# Patient Record
Sex: MALE | Race: BLACK OR AFRICAN AMERICAN | Employment: UNEMPLOYED | URBAN - METROPOLITAN AREA
[De-identification: names, ages, dates, MRNs, and addresses within clinical notes are randomized per-mention and may not be internally consistent; named-entity substitution may affect disease eponyms.]

---

## 2020-10-23 ENCOUNTER — OFFICE VISIT (OUTPATIENT)
Dept: FAMILY MEDICINE CLINIC | Age: 50
End: 2020-10-23
Payer: MEDICARE

## 2020-10-23 VITALS
OXYGEN SATURATION: 99 % | WEIGHT: 204.4 LBS | SYSTOLIC BLOOD PRESSURE: 112 MMHG | BODY MASS INDEX: 24.89 KG/M2 | RESPIRATION RATE: 16 BRPM | HEIGHT: 76 IN | DIASTOLIC BLOOD PRESSURE: 78 MMHG | HEART RATE: 79 BPM | TEMPERATURE: 98.7 F

## 2020-10-23 PROBLEM — F43.20 GRIEF REACTION: Status: ACTIVE | Noted: 2020-10-23

## 2020-10-23 PROBLEM — Z72.0 TOBACCO ABUSE: Status: ACTIVE | Noted: 2020-10-23

## 2020-10-23 PROBLEM — N52.1 ERECTILE DYSFUNCTION DUE TO DISEASES CLASSIFIED ELSEWHERE: Status: ACTIVE | Noted: 2020-10-23

## 2020-10-23 PROBLEM — F43.21 GRIEF REACTION: Status: ACTIVE | Noted: 2020-10-23

## 2020-10-23 PROBLEM — Z80.0 FAMILY HISTORY OF COLON CANCER: Status: ACTIVE | Noted: 2020-10-23

## 2020-10-23 PROCEDURE — 99203 OFFICE O/P NEW LOW 30 MIN: CPT | Performed by: PHYSICIAN ASSISTANT

## 2020-10-23 SDOH — HEALTH STABILITY: MENTAL HEALTH: HOW OFTEN DO YOU HAVE A DRINK CONTAINING ALCOHOL?: NEVER

## 2020-10-23 ASSESSMENT — ENCOUNTER SYMPTOMS
COUGH: 0
DIARRHEA: 0
NAUSEA: 0
COLOR CHANGE: 0
CONSTIPATION: 0
SHORTNESS OF BREATH: 0
ANAL BLEEDING: 0
TROUBLE SWALLOWING: 0
CHEST TIGHTNESS: 0
WHEEZING: 0
BACK PAIN: 0
RECTAL PAIN: 0
VOMITING: 0
ABDOMINAL DISTENTION: 0
BLOOD IN STOOL: 0
ABDOMINAL PAIN: 0

## 2020-10-23 ASSESSMENT — PATIENT HEALTH QUESTIONNAIRE - PHQ9
1. LITTLE INTEREST OR PLEASURE IN DOING THINGS: 0
2. FEELING DOWN, DEPRESSED OR HOPELESS: 0
SUM OF ALL RESPONSES TO PHQ9 QUESTIONS 1 & 2: 0
SUM OF ALL RESPONSES TO PHQ QUESTIONS 1-9: 0

## 2020-10-23 NOTE — PROGRESS NOTES
Subjective  Ragena Ing, 54 y.o. male presents today with:  Chief Complaint   Patient presents with    Establish Care     HPI  Kristina Sam is in the office today to establish care. Previous PCP: no one. Declined lab work. Does not want flu vaccine. Does not want referral to GI to discuss early     Grief reaction. Recently lost his mom and older sister in the past 4-6 weeks. His mother had terminal colon CA, passed at age 60 y/o. Initially was diagnosed when she was younger than 48. Sister was killed tragically in a motorcycle accident in [facility]. Reports that he is \"dealing with his grief\". Has been having issues with ED since the death of both his mom and sister. Feels like his mind \"goes elsewhere\" during intimacy. No prior problems with achieving, maintaining or having an orgasm. Denies SI/HI. Tobacco abuse. Smoking about 1 PPD. Would like to quit. Not ready at this time. Review of Systems   Constitutional: Negative for activity change, appetite change, chills, diaphoresis, fatigue, fever and unexpected weight change. HENT: Negative for congestion, postnasal drip and trouble swallowing. Eyes: Negative for visual disturbance. Respiratory: Negative for cough, chest tightness, shortness of breath and wheezing. Cardiovascular: Negative for chest pain, palpitations and leg swelling. Gastrointestinal: Negative for abdominal distention, abdominal pain, anal bleeding, blood in stool, constipation, diarrhea, nausea, rectal pain and vomiting. Endocrine: Negative for polydipsia, polyphagia and polyuria. Genitourinary: Negative for decreased urine volume, difficulty urinating, discharge, dysuria, enuresis, flank pain, frequency, genital sores, hematuria, penile pain, penile swelling, scrotal swelling, testicular pain and urgency. ED   Musculoskeletal: Negative for arthralgias, back pain, gait problem, joint swelling, myalgias, neck pain and neck stiffness. Skin: Negative for color change, pallor, rash and wound. Neurological: Negative for dizziness, speech difficulty, weakness and light-headedness. Psychiatric/Behavioral: Positive for dysphoric mood (grief). Negative for agitation, confusion, decreased concentration, hallucinations, self-injury, sleep disturbance and suicidal ideas. The patient is not nervous/anxious and is not hyperactive. No past medical history on file. No past surgical history on file.   Social History     Socioeconomic History    Marital status: Single     Spouse name: Not on file    Number of children: Not on file    Years of education: Not on file    Highest education level: Not on file   Occupational History    Not on file   Social Needs    Financial resource strain: Not on file    Food insecurity     Worry: Not on file     Inability: Not on file    Transportation needs     Medical: Not on file     Non-medical: Not on file   Tobacco Use    Smoking status: Current Every Day Smoker     Packs/day: 1.00     Years: 15.00     Pack years: 15.00     Types: Cigarettes    Smokeless tobacco: Never Used   Substance and Sexual Activity    Alcohol use: Never     Frequency: Never    Drug use: Yes     Types: Marijuana    Sexual activity: Not on file   Lifestyle    Physical activity     Days per week: Not on file     Minutes per session: Not on file    Stress: Not on file   Relationships    Social connections     Talks on phone: Not on file     Gets together: Not on file     Attends [employer] service: Not on file     Active member of club or organization: Not on file     Attends meetings of clubs or organizations: Not on file     Relationship status: Not on file    Intimate partner violence     Fear of current or ex partner: Not on file     Emotionally abused: Not on file     Physically abused: Not on file     Forced sexual activity: Not on file   Other Topics Concern    Not on file   Social History Narrative    Not on file     No family history on file. No Known Allergies  No current outpatient medications on file. No current facility-administered medications for this visit. PMH, Surgical Hx, Family Hx, and Social Hx reviewed and updated. Health Maintenance reviewed. Objective  Vitals:    10/23/20 1259   BP: 112/78   Site: Left Upper Arm   Position: Sitting   Cuff Size: Large Adult   Pulse: 79   Resp: 16   Temp: 98.7 °F (37.1 °C)   TempSrc: Temporal   SpO2: 99%   Weight: 204 lb 6.4 oz (92.7 kg)   Height: 6' 4\" (1.93 m)     BP Readings from Last 3 Encounters:   10/23/20 112/78     Wt Readings from Last 3 Encounters:   10/23/20 204 lb 6.4 oz (92.7 kg)     Physical Exam  Vitals signs reviewed. Constitutional:       Appearance: Normal appearance. HENT:      Head: Normocephalic and atraumatic. Right Ear: External ear normal.      Left Ear: External ear normal.      Nose: Nose normal.      Mouth/Throat:      Mouth: Mucous membranes are moist.   Eyes:      Conjunctiva/sclera: Conjunctivae normal.      Pupils: Pupils are equal, round, and reactive to light. Cardiovascular:      Rate and Rhythm: Normal rate and regular rhythm. Pulmonary:      Effort: Pulmonary effort is normal.      Breath sounds: Normal breath sounds. Musculoskeletal: Normal range of motion. Skin:     General: Skin is warm. Neurological:      General: No focal deficit present. Mental Status: He is alert and oriented to person, place, and time. Psychiatric:         Attention and Perception: Attention normal.         Mood and Affect: Affect is flat. Speech: Speech normal.         Behavior: Behavior normal.         Thought Content: Thought content normal.         Cognition and Memory: Cognition normal.         Judgment: Judgment normal.      Comments: Noticeably upset with loss of his mother and sister. Very close to both. \"dealing with his emotions\". Thinks much of his ED is from his emotions/feeling.   Trying to process

## 2021-03-14 ENCOUNTER — HOSPITAL ENCOUNTER (EMERGENCY)
Age: 51
Discharge: HOME | End: 2021-03-14
Attending: EMERGENCY MEDICINE
Payer: MEDICARE

## 2021-03-14 ENCOUNTER — APPOINTMENT (OUTPATIENT)
Dept: CT IMAGING | Age: 51
End: 2021-03-14
Payer: MEDICARE

## 2021-03-14 VITALS
HEIGHT: 76 IN | RESPIRATION RATE: 16 BRPM | OXYGEN SATURATION: 99 % | HEART RATE: 62 BPM | BODY MASS INDEX: 23.75 KG/M2 | TEMPERATURE: 97.8 F | DIASTOLIC BLOOD PRESSURE: 84 MMHG | SYSTOLIC BLOOD PRESSURE: 121 MMHG | WEIGHT: 195 LBS

## 2021-03-14 DIAGNOSIS — M54.40 ACUTE LEFT-SIDED LOW BACK PAIN WITH SCIATICA, SCIATICA LATERALITY UNSPECIFIED: Primary | ICD-10-CM

## 2021-03-14 LAB
ALBUMIN SERPL-MCNC: 4 G/DL (ref 3.5–4.6)
ALP BLD-CCNC: 96 U/L (ref 35–104)
ALT SERPL-CCNC: 13 U/L (ref 0–41)
ANION GAP SERPL CALCULATED.3IONS-SCNC: 7 MEQ/L (ref 9–15)
AST SERPL-CCNC: 18 U/L (ref 0–40)
BASOPHILS ABSOLUTE: 0.1 K/UL (ref 0–0.2)
BASOPHILS RELATIVE PERCENT: 1 %
BILIRUB SERPL-MCNC: 0.3 MG/DL (ref 0.2–0.7)
BILIRUBIN URINE: NEGATIVE
BLOOD, URINE: NEGATIVE
BUN BLDV-MCNC: 9 MG/DL (ref 6–20)
CALCIUM SERPL-MCNC: 9.1 MG/DL (ref 8.5–9.9)
CHLORIDE BLD-SCNC: 102 MEQ/L (ref 95–107)
CLARITY: CLEAR
CO2: 28 MEQ/L (ref 20–31)
COLOR: YELLOW
CREAT SERPL-MCNC: 1 MG/DL (ref 0.7–1.2)
EOSINOPHILS ABSOLUTE: 0.1 K/UL (ref 0–0.7)
EOSINOPHILS RELATIVE PERCENT: 1.7 %
GFR AFRICAN AMERICAN: >60
GFR NON-AFRICAN AMERICAN: >60
GLOBULIN: 2.5 G/DL (ref 2.3–3.5)
GLUCOSE BLD-MCNC: 93 MG/DL (ref 70–99)
GLUCOSE URINE: NEGATIVE MG/DL
HCT VFR BLD CALC: 40.9 % (ref 42–52)
HEMOGLOBIN: 13.7 G/DL (ref 14–18)
KETONES, URINE: NEGATIVE MG/DL
LEUKOCYTE ESTERASE, URINE: NEGATIVE
LYMPHOCYTES ABSOLUTE: 1.6 K/UL (ref 1–4.8)
LYMPHOCYTES RELATIVE PERCENT: 26.2 %
MCH RBC QN AUTO: 33.1 PG (ref 27–31.3)
MCHC RBC AUTO-ENTMCNC: 33.6 % (ref 33–37)
MCV RBC AUTO: 98.6 FL (ref 80–100)
MONOCYTES ABSOLUTE: 0.7 K/UL (ref 0.2–0.8)
MONOCYTES RELATIVE PERCENT: 12.1 %
NEUTROPHILS ABSOLUTE: 3.6 K/UL (ref 1.4–6.5)
NEUTROPHILS RELATIVE PERCENT: 59 %
NITRITE, URINE: NEGATIVE
PDW BLD-RTO: 13.7 % (ref 11.5–14.5)
PH UA: 6.5 (ref 5–9)
PLATELET # BLD: 273 K/UL (ref 130–400)
POTASSIUM SERPL-SCNC: 4.7 MEQ/L (ref 3.4–4.9)
PROTEIN UA: NEGATIVE MG/DL
RBC # BLD: 4.14 M/UL (ref 4.7–6.1)
SODIUM BLD-SCNC: 137 MEQ/L (ref 135–144)
SPECIFIC GRAVITY UA: 1.02 (ref 1–1.03)
TOTAL PROTEIN: 6.5 G/DL (ref 6.3–8)
UROBILINOGEN, URINE: 1 E.U./DL
WBC # BLD: 6.1 K/UL (ref 4.8–10.8)

## 2021-03-14 PROCEDURE — 36415 COLL VENOUS BLD VENIPUNCTURE: CPT

## 2021-03-14 PROCEDURE — 74176 CT ABD & PELVIS W/O CONTRAST: CPT

## 2021-03-14 PROCEDURE — 80053 COMPREHEN METABOLIC PANEL: CPT

## 2021-03-14 PROCEDURE — 85025 COMPLETE CBC W/AUTO DIFF WBC: CPT

## 2021-03-14 PROCEDURE — 96374 THER/PROPH/DIAG INJ IV PUSH: CPT

## 2021-03-14 PROCEDURE — 2580000003 HC RX 258: Performed by: EMERGENCY MEDICINE

## 2021-03-14 PROCEDURE — 96375 TX/PRO/DX INJ NEW DRUG ADDON: CPT

## 2021-03-14 PROCEDURE — 6360000002 HC RX W HCPCS: Performed by: EMERGENCY MEDICINE

## 2021-03-14 PROCEDURE — 99284 EMERGENCY DEPT VISIT MOD MDM: CPT

## 2021-03-14 PROCEDURE — 81003 URINALYSIS AUTO W/O SCOPE: CPT

## 2021-03-14 RX ORDER — 0.9 % SODIUM CHLORIDE 0.9 %
1000 INTRAVENOUS SOLUTION INTRAVENOUS ONCE
Status: COMPLETED | OUTPATIENT
Start: 2021-03-14 | End: 2021-03-14

## 2021-03-14 RX ORDER — TRAMADOL HYDROCHLORIDE 50 MG/1
50 TABLET ORAL EVERY 4 HOURS PRN
Qty: 18 TABLET | Refills: 0 | Status: SHIPPED | OUTPATIENT
Start: 2021-03-14 | End: 2021-03-17

## 2021-03-14 RX ORDER — MORPHINE SULFATE 2 MG/ML
4 INJECTION, SOLUTION INTRAMUSCULAR; INTRAVENOUS
Status: DISCONTINUED | OUTPATIENT
Start: 2021-03-14 | End: 2021-03-14 | Stop reason: HOSPADM

## 2021-03-14 RX ORDER — CYCLOBENZAPRINE HCL 10 MG
10 TABLET ORAL NIGHTLY PRN
Qty: 10 TABLET | Refills: 0 | Status: SHIPPED | OUTPATIENT
Start: 2021-03-14 | End: 2021-03-24

## 2021-03-14 RX ORDER — ONDANSETRON 2 MG/ML
4 INJECTION INTRAMUSCULAR; INTRAVENOUS ONCE
Status: COMPLETED | OUTPATIENT
Start: 2021-03-14 | End: 2021-03-14

## 2021-03-14 RX ORDER — KETOROLAC TROMETHAMINE 30 MG/ML
30 INJECTION, SOLUTION INTRAMUSCULAR; INTRAVENOUS ONCE
Status: COMPLETED | OUTPATIENT
Start: 2021-03-14 | End: 2021-03-14

## 2021-03-14 RX ADMIN — ONDANSETRON 4 MG: 2 INJECTION INTRAMUSCULAR; INTRAVENOUS at 09:15

## 2021-03-14 RX ADMIN — KETOROLAC TROMETHAMINE 30 MG: 30 INJECTION, SOLUTION INTRAMUSCULAR; INTRAVENOUS at 09:15

## 2021-03-14 RX ADMIN — MORPHINE SULFATE 4 MG: 2 INJECTION, SOLUTION INTRAMUSCULAR; INTRAVENOUS at 09:15

## 2021-03-14 RX ADMIN — SODIUM CHLORIDE 1000 ML: 9 INJECTION, SOLUTION INTRAVENOUS at 09:15

## 2021-03-14 ASSESSMENT — ENCOUNTER SYMPTOMS
SHORTNESS OF BREATH: 0
ABDOMINAL PAIN: 0
SORE THROAT: 0
DIARRHEA: 0
BACK PAIN: 0
NAUSEA: 1
VOMITING: 0
COUGH: 0

## 2021-03-14 ASSESSMENT — PAIN DESCRIPTION - PAIN TYPE: TYPE: ACUTE PAIN

## 2021-03-14 ASSESSMENT — PAIN SCALES - GENERAL
PAINLEVEL_OUTOF10: 10
PAINLEVEL_OUTOF10: 10
PAINLEVEL_OUTOF10: 2

## 2021-03-14 NOTE — ED PROVIDER NOTES
[address] [facility] ED  eMERGENCYdEPARTMENT eNCOUnter      Pt Name: Wale Spivey  MRN: [id number]  [place] 1970  Date of evaluation: 3/14/2021  Radha Fung MD    CHIEF COMPLAINT           HPI  Wale Spivey is a 52 y.o. male per chart review has no pmh presents to the ED with L flank pain. Pt notes gradual onset, moderate, constant, aching, L flank pain x 2 days. +N/-v.  +Dysuria. Pt denies fever, cp, sob, dysuria, diarrhea. ROS  Review of Systems   Constitutional: Negative for activity change, chills and fever. HENT: Negative for ear pain and sore throat. Eyes: Negative for visual disturbance. Respiratory: Negative for cough and shortness of breath. Cardiovascular: Negative for chest pain, palpitations and leg swelling. Gastrointestinal: Positive for nausea. Negative for abdominal pain, diarrhea and vomiting. Genitourinary: Positive for dysuria and flank pain. Musculoskeletal: Negative for back pain. Skin: Negative for rash. Neurological: Negative for dizziness and weakness. Except as noted above the remainder of the review of systems was reviewed and negative. PAST MEDICAL HISTORY   History reviewed. No pertinent past medical history. SURGICAL HISTORY     History reviewed. No pertinent surgical history. CURRENTMEDICATIONS       Previous Medications    No medications on file       ALLERGIES     Patient has no known allergies. FAMILY HISTORY     History reviewed. No pertinent family history.        SOCIAL HISTORY       Social History     Socioeconomic History    Marital status: Single     Spouse name: None    Number of children: None    Years of education: None    Highest education level: None   Occupational History    None   Social Needs    Financial resource strain: None    Food insecurity     Worry: None     Inability: None    Transportation needs     Medical: None     Non-medical: None   Tobacco Use    Smoking status: Current Every Day Smoker     Packs/day: 1.00     Years: 15.00     Pack years: 15.00     Types: Cigarettes    Smokeless tobacco: Never Used   Substance and Sexual Activity    Alcohol use: Never     Frequency: Never    Drug use: Yes     Types: Marijuana    Sexual activity: None   Lifestyle    Physical activity     Days per week: None     Minutes per session: None    Stress: None   Relationships    Social connections     Talks on phone: None     Gets together: None     Attends Synagogue service: None     Active member of club or organization: None     Attends meetings of clubs or organizations: None     Relationship status: None    Intimate partner violence     Fear of current or ex partner: None     Emotionally abused: None     Physically abused: None     Forced sexual activity: None   Other Topics Concern    None   Social History Narrative    None         PHYSICAL EXAM       ED Triage Vitals [03/14/21 0847]   BP Temp Temp Source Pulse Resp SpO2 Height Weight   (!) 163/83 97.8 °F (36.6 °C) Temporal 72 20 100 % 6' 4\" (1.93 m) 195 lb (88.5 kg)       Physical Exam  Vitals signs and nursing note reviewed. Constitutional:       Appearance: He is well-developed. HENT:      Head: Normocephalic. Right Ear: External ear normal.      Left Ear: External ear normal.   Eyes:      Conjunctiva/sclera: Conjunctivae normal.      Pupils: Pupils are equal, round, and reactive to light. Neck:      Musculoskeletal: Normal range of motion and neck supple. Cardiovascular:      Rate and Rhythm: Normal rate and regular rhythm. Heart sounds: Normal heart sounds. Pulmonary:      Effort: Pulmonary effort is normal.      Breath sounds: Normal breath sounds. Abdominal:      General: Bowel sounds are normal. There is no distension. Palpations: Abdomen is soft. Tenderness: There is no abdominal tenderness. There is left CVA tenderness. Musculoskeletal: Normal range of motion.    Skin:     General: Skin is warm and dry.   Neurological:      Mental Status: He is alert and oriented to person, place, and time. Psychiatric:         Mood and Affect: Mood normal.           MDM  53 yo male presents to the ED with L flank pain, dysuria, nausea. Pt is afebrile, hemodynamically stable. Pt given 1 L NS, IV morphine, IV zofran, IV toradol with moderate relief. Labs, UA negative. CT AP negative. Pt reassessed and feels much better. Pt tolerating PO ginger ale. Pt likely with msk strain. Pt given prescription for tramadol, flexeril, given back pain warning signs and will f/u with pcp. Pt understands plan. FINAL IMPRESSION      1.  Acute left-sided low back pain with sciatica, sciatica laterality unspecified          DISPOSITION/PLAN   DISPOSITION Decision To Discharge 03/14/2021 10:09:06 AM        DISCHARGE MEDICATIONS:  [unfilled]         Naga Matta MD(electronically signed)  Attending Emergency Physician            Naga Matta MD  03/14/21 1018

## 2021-03-14 NOTE — ED TRIAGE NOTES
Patient here with complaints of 10/10 left flank pain that started about four days ago. The pain has become unbearable today. Patient reports nausea and headache. He denies history of kidney stones. He reports suprapubic tenderness. Denies pain with urination.

## 2021-05-04 ENCOUNTER — APPOINTMENT (OUTPATIENT)
Dept: GENERAL RADIOLOGY | Age: 51
End: 2021-05-04
Payer: MEDICARE

## 2021-05-04 ENCOUNTER — APPOINTMENT (OUTPATIENT)
Dept: CT IMAGING | Age: 51
End: 2021-05-04
Payer: MEDICARE

## 2021-05-04 ENCOUNTER — HOSPITAL ENCOUNTER (EMERGENCY)
Age: 51
Discharge: HOME | End: 2021-05-04
Attending: STUDENT IN AN ORGANIZED HEALTH CARE EDUCATION/TRAINING PROGRAM
Payer: MEDICARE

## 2021-05-04 VITALS
HEART RATE: 84 BPM | SYSTOLIC BLOOD PRESSURE: 136 MMHG | WEIGHT: 195 LBS | HEIGHT: 75 IN | TEMPERATURE: 98.5 F | OXYGEN SATURATION: 99 % | BODY MASS INDEX: 24.25 KG/M2 | RESPIRATION RATE: 20 BRPM | DIASTOLIC BLOOD PRESSURE: 88 MMHG

## 2021-05-04 DIAGNOSIS — S06.0X0A CLOSED HEAD INJURY WITH CONCUSSION, WITHOUT LOSS OF CONSCIOUSNESS, INITIAL ENCOUNTER: ICD-10-CM

## 2021-05-04 DIAGNOSIS — S01.01XA LACERATION OF SCALP, INITIAL ENCOUNTER: Primary | ICD-10-CM
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
## 2021-05-04 DIAGNOSIS — S61.412A LACERATION OF LEFT HAND WITHOUT FOREIGN BODY, INITIAL ENCOUNTER: ICD-10-CM

## 2021-05-04 LAB
ABO/RH: NORMAL
ALBUMIN SERPL-MCNC: 5 G/DL (ref 3.5–4.6)
ALP BLD-CCNC: 98 U/L (ref 35–104)
ALT SERPL-CCNC: 11 U/L (ref 0–41)
AMPHETAMINE SCREEN, URINE: ABNORMAL
ANION GAP SERPL CALCULATED.3IONS-SCNC: 26 MEQ/L (ref 9–15)
ANTIBODY SCREEN: NORMAL
APTT: 26.9 SEC (ref 24.4–36.8)
AST SERPL-CCNC: 20 U/L (ref 0–40)
ATYPICAL LYMPHOCYTE RELATIVE PERCENT: 3 %
BACTERIA: NEGATIVE /HPF
BARBITURATE SCREEN URINE: ABNORMAL
BASOPHILS ABSOLUTE: 0 K/UL (ref 0–0.2)
BASOPHILS RELATIVE PERCENT: 0.4 %
BENZODIAZEPINE SCREEN, URINE: ABNORMAL
BILIRUB SERPL-MCNC: 1 MG/DL (ref 0.2–0.7)
BILIRUBIN URINE: NEGATIVE
BLOOD, URINE: NEGATIVE
BUN BLDV-MCNC: 13 MG/DL (ref 6–20)
BURR CELLS: ABNORMAL
CALCIUM SERPL-MCNC: 9.5 MG/DL (ref 8.5–9.9)
CANNABINOID SCREEN URINE: POSITIVE
CHLORIDE BLD-SCNC: 99 MEQ/L (ref 95–107)
CK MB: 2.5 NG/ML (ref 0–6.7)
CLARITY: CLEAR
CO2: 15 MEQ/L (ref 20–31)
COCAINE METABOLITE SCREEN URINE: POSITIVE
COLOR: YELLOW
CREAT SERPL-MCNC: 1.28 MG/DL (ref 0.7–1.2)
CREATINE KINASE-MB INDEX: 0.6 % (ref 0–3.5)
EOSINOPHILS ABSOLUTE: 0.1 K/UL (ref 0–0.7)
EOSINOPHILS RELATIVE PERCENT: 1 %
EPITHELIAL CELLS, UA: ABNORMAL /HPF (ref 0–5)
ETHANOL PERCENT: NORMAL G/DL
ETHANOL: <10 MG/DL (ref 0–0.08)
GFR AFRICAN AMERICAN: >60
GFR NON-AFRICAN AMERICAN: 59.9
GLOBULIN: 3.2 G/DL (ref 2.3–3.5)
GLUCOSE BLD-MCNC: 148 MG/DL (ref 70–99)
GLUCOSE URINE: NEGATIVE MG/DL
HCT VFR BLD CALC: 47 % (ref 42–52)
HEMOGLOBIN: 15.5 G/DL (ref 14–18)
HYALINE CASTS: ABNORMAL /HPF (ref 0–5)
INR BLD: 1
KETONES, URINE: ABNORMAL MG/DL
LEUKOCYTE ESTERASE, URINE: NEGATIVE
LYMPHOCYTES ABSOLUTE: 6.3 K/UL (ref 1–4.8)
LYMPHOCYTES RELATIVE PERCENT: 54 %
Lab: ABNORMAL
MCH RBC QN AUTO: 33.2 PG (ref 27–31.3)
MCHC RBC AUTO-ENTMCNC: 33 % (ref 33–37)
MCV RBC AUTO: 100.5 FL (ref 80–100)
METHADONE SCREEN, URINE: ABNORMAL
MONOCYTES ABSOLUTE: 0.9 K/UL (ref 0.2–0.8)
MONOCYTES RELATIVE PERCENT: 7.6 %
NEUTROPHILS ABSOLUTE: 3.7 K/UL (ref 1.4–6.5)
NEUTROPHILS RELATIVE PERCENT: 34 %
NITRITE, URINE: NEGATIVE
OPIATE SCREEN URINE: ABNORMAL
OXYCODONE URINE: ABNORMAL
PDW BLD-RTO: 14.3 % (ref 11.5–14.5)
PH UA: 7 (ref 5–9)
PHENCYCLIDINE SCREEN URINE: ABNORMAL
PLATELET # BLD: 322 K/UL (ref 130–400)
PLATELET SLIDE REVIEW: NORMAL
POIKILOCYTES: ABNORMAL
POTASSIUM SERPL-SCNC: 3.5 MEQ/L (ref 3.4–4.9)
PROPOXYPHENE SCREEN: ABNORMAL
PROTEIN UA: >=300 MG/DL
PROTHROMBIN TIME: 13.6 SEC (ref 12.3–14.9)
RBC # BLD: 4.68 M/UL (ref 4.7–6.1)
RBC UA: ABNORMAL /HPF (ref 0–5)
SODIUM BLD-SCNC: 140 MEQ/L (ref 135–144)
SPECIFIC GRAVITY UA: 1.02 (ref 1–1.03)
TOTAL CK: 389 U/L (ref 0–190)
TOTAL PROTEIN: 8.2 G/DL (ref 6.3–8)
TROPONIN: <0.01 NG/ML (ref 0–0.01)
TROPONIN: <0.01 NG/ML (ref 0–0.01)
URINE REFLEX TO CULTURE: ABNORMAL
UROBILINOGEN, URINE: 1 E.U./DL
WBC # BLD: 11 K/UL (ref 4.8–10.8)
WBC UA: ABNORMAL /HPF (ref 0–5)

## 2021-05-04 PROCEDURE — 73060 X-RAY EXAM OF HUMERUS: CPT

## 2021-05-04 PROCEDURE — 73130 X-RAY EXAM OF HAND: CPT

## 2021-05-04 PROCEDURE — 85730 THROMBOPLASTIN TIME PARTIAL: CPT

## 2021-05-04 PROCEDURE — 96374 THER/PROPH/DIAG INJ IV PUSH: CPT

## 2021-05-04 PROCEDURE — 82550 ASSAY OF CK (CPK): CPT

## 2021-05-04 PROCEDURE — 99284 EMERGENCY DEPT VISIT MOD MDM: CPT | Performed by: SURGERY

## 2021-05-04 PROCEDURE — 6360000002 HC RX W HCPCS: Performed by: STUDENT IN AN ORGANIZED HEALTH CARE EDUCATION/TRAINING PROGRAM

## 2021-05-04 PROCEDURE — 80307 DRUG TEST PRSMV CHEM ANLYZR: CPT

## 2021-05-04 PROCEDURE — 73090 X-RAY EXAM OF FOREARM: CPT

## 2021-05-04 PROCEDURE — 84484 ASSAY OF TROPONIN QUANT: CPT

## 2021-05-04 PROCEDURE — 36415 COLL VENOUS BLD VENIPUNCTURE: CPT

## 2021-05-04 PROCEDURE — 71046 X-RAY EXAM CHEST 2 VIEWS: CPT

## 2021-05-04 PROCEDURE — 81001 URINALYSIS AUTO W/SCOPE: CPT

## 2021-05-04 PROCEDURE — 82077 ASSAY SPEC XCP UR&BREATH IA: CPT

## 2021-05-04 PROCEDURE — 72125 CT NECK SPINE W/O DYE: CPT

## 2021-05-04 PROCEDURE — 86901 BLOOD TYPING SEROLOGIC RH(D): CPT

## 2021-05-04 PROCEDURE — 93005 ELECTROCARDIOGRAM TRACING: CPT | Performed by: STUDENT IN AN ORGANIZED HEALTH CARE EDUCATION/TRAINING PROGRAM

## 2021-05-04 PROCEDURE — 85610 PROTHROMBIN TIME: CPT

## 2021-05-04 PROCEDURE — 70450 CT HEAD/BRAIN W/O DYE: CPT

## 2021-05-04 PROCEDURE — 85025 COMPLETE CBC W/AUTO DIFF WBC: CPT

## 2021-05-04 PROCEDURE — 12005 RPR S/N/A/GEN/TRK12.6-20.0CM: CPT

## 2021-05-04 PROCEDURE — 86900 BLOOD TYPING SEROLOGIC ABO: CPT

## 2021-05-04 PROCEDURE — 80053 COMPREHEN METABOLIC PANEL: CPT

## 2021-05-04 PROCEDURE — 6830039000 HC L3 TRAUMA ALERT

## 2021-05-04 PROCEDURE — 2580000003 HC RX 258: Performed by: EMERGENCY MEDICINE

## 2021-05-04 PROCEDURE — 86850 RBC ANTIBODY SCREEN: CPT

## 2021-05-04 PROCEDURE — 6370000000 HC RX 637 (ALT 250 FOR IP): Performed by: STUDENT IN AN ORGANIZED HEALTH CARE EDUCATION/TRAINING PROGRAM

## 2021-05-04 PROCEDURE — 82553 CREATINE MB FRACTION: CPT

## 2021-05-04 PROCEDURE — 99285 EMERGENCY DEPT VISIT HI MDM: CPT

## 2021-05-04 PROCEDURE — 2500000003 HC RX 250 WO HCPCS: Performed by: STUDENT IN AN ORGANIZED HEALTH CARE EDUCATION/TRAINING PROGRAM

## 2021-05-04 RX ORDER — LIDOCAINE HYDROCHLORIDE AND EPINEPHRINE 10; 10 MG/ML; UG/ML
20 INJECTION, SOLUTION INFILTRATION; PERINEURAL ONCE
Status: COMPLETED | OUTPATIENT
Start: 2021-05-04 | End: 2021-05-04

## 2021-05-04 RX ORDER — HYDROCODONE BITARTRATE AND ACETAMINOPHEN 5; 325 MG/1; MG/1
1 TABLET ORAL EVERY 6 HOURS PRN
Qty: 10 TABLET | Refills: 0 | Status: SHIPPED | OUTPATIENT
Start: 2021-05-04 | End: 2021-05-07

## 2021-05-04 RX ORDER — KETOROLAC TROMETHAMINE 30 MG/ML
30 INJECTION, SOLUTION INTRAMUSCULAR; INTRAVENOUS ONCE
Status: COMPLETED | OUTPATIENT
Start: 2021-05-04 | End: 2021-05-04

## 2021-05-04 RX ORDER — ACETAMINOPHEN 500 MG
1000 TABLET ORAL ONCE
Status: COMPLETED | OUTPATIENT
Start: 2021-05-04 | End: 2021-05-04

## 2021-05-04 RX ORDER — MAGNESIUM HYDROXIDE 1200 MG/15ML
1000 LIQUID ORAL CONTINUOUS
Status: DISCONTINUED | OUTPATIENT
Start: 2021-05-04 | End: 2021-05-04 | Stop reason: HOSPADM

## 2021-05-04 RX ORDER — CEPHALEXIN 500 MG/1
500 CAPSULE ORAL 2 TIMES DAILY
Qty: 14 CAPSULE | Refills: 0 | Status: SHIPPED | OUTPATIENT
Start: 2021-05-04 | End: 2021-05-11

## 2021-05-04 RX ADMIN — KETOROLAC TROMETHAMINE 30 MG: 30 INJECTION, SOLUTION INTRAMUSCULAR; INTRAVENOUS at 19:56

## 2021-05-04 RX ADMIN — ACETAMINOPHEN 1000 MG: 500 TABLET ORAL at 19:56

## 2021-05-04 RX ADMIN — LIDOCAINE HYDROCHLORIDE,EPINEPHRINE BITARTRATE 20 ML: 10; .01 INJECTION, SOLUTION INFILTRATION; PERINEURAL at 20:24

## 2021-05-04 RX ADMIN — Medication: at 20:24

## 2021-05-04 RX ADMIN — SODIUM CHLORIDE 1000 ML: 900 IRRIGANT IRRIGATION at 20:24

## 2021-05-04 ASSESSMENT — ENCOUNTER SYMPTOMS
ABDOMINAL PAIN: 0
BACK PAIN: 0
SHORTNESS OF BREATH: 0
SINUS PRESSURE: 0
DIARRHEA: 0
COUGH: 0
VOMITING: 0
TROUBLE SWALLOWING: 0
CHEST TIGHTNESS: 0

## 2021-05-04 ASSESSMENT — PAIN DESCRIPTION - LOCATION: LOCATION: HEAD;ARM;SHOULDER

## 2021-05-04 ASSESSMENT — PAIN SCALES - GENERAL: PAINLEVEL_OUTOF10: 10

## 2021-05-04 ASSESSMENT — PAIN DESCRIPTION - PAIN TYPE: TYPE: ACUTE PAIN

## 2021-05-04 ASSESSMENT — PAIN DESCRIPTION - DESCRIPTORS: DESCRIPTORS: THROBBING;ACHING
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
## 2021-05-04 NOTE — ED NOTES
Wound care to lt hand. Soaked hand in warm water and chlorhexidine.       Maureen Webb RN  05/04/21 1359

## 2021-05-04 NOTE — ED NOTES
Air way intact, semitic .  A&Ox4, PERRLA 2  mm, TP membrane intact, 7 cm lac parietal area, chest non tender, abd non tender, lac 2nd web spacing lt hand, posterior shoulder tender around scapula        Yousif Negron RN  05/04/21 7486
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
## 2021-05-05 LAB
EKG ATRIAL RATE: 70 BPM
EKG P AXIS: 79 DEGREES
EKG P-R INTERVAL: 150 MS
EKG Q-T INTERVAL: 386 MS
EKG QRS DURATION: 82 MS
EKG QTC CALCULATION (BAZETT): 416 MS
EKG R AXIS: 62 DEGREES
EKG T AXIS: 61 DEGREES
EKG VENTRICULAR RATE: 70 BPM

## 2021-05-05 PROCEDURE — 93010 ELECTROCARDIOGRAM REPORT: CPT | Performed by: INTERNAL MEDICINE

## 2021-05-05 NOTE — ED NOTES
DC instructions reviewed with patient. Patient aware of signs and symptoms of infection. Patient aware of follow up appointments and when to come back to the ER. Reviewed prescriptions with patient. Patient verbalized understanding. Patient ambulated out of ER with steady gait.       Mike Ignacio RN  05/04/21 8418

## 2021-05-14 ENCOUNTER — HOSPITAL ENCOUNTER (EMERGENCY)
Age: 51
Discharge: HOME | End: 2021-05-14
Payer: MEDICARE

## 2021-05-14 VITALS
SYSTOLIC BLOOD PRESSURE: 148 MMHG | TEMPERATURE: 98 F | RESPIRATION RATE: 16 BRPM | HEART RATE: 78 BPM | WEIGHT: 205 LBS | HEIGHT: 76 IN | BODY MASS INDEX: 24.96 KG/M2 | OXYGEN SATURATION: 99 % | DIASTOLIC BLOOD PRESSURE: 78 MMHG

## 2021-05-14 DIAGNOSIS — Z48.02: Primary | ICD-10-CM

## 2021-05-14 PROCEDURE — 99282 EMERGENCY DEPT VISIT SF MDM: CPT

## 2021-05-14 ASSESSMENT — ENCOUNTER SYMPTOMS
CONSTIPATION: 0
TROUBLE SWALLOWING: 0
SHORTNESS OF BREATH: 0
WHEEZING: 0
SORE THROAT: 0
PHOTOPHOBIA: 0
COLOR CHANGE: 0
DIARRHEA: 0
CHEST TIGHTNESS: 0
NAUSEA: 0
COUGH: 0
ABDOMINAL DISTENTION: 0
RHINORRHEA: 0
VOMITING: 0
ABDOMINAL PAIN: 0
BACK PAIN: 0
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
## 2021-08-12 ENCOUNTER — HOSPITAL ENCOUNTER (EMERGENCY)
Age: 51
Discharge: HOME | End: 2021-08-12
Payer: MEDICARE

## 2021-08-12 VITALS
HEIGHT: 76 IN | RESPIRATION RATE: 16 BRPM | WEIGHT: 190 LBS | BODY MASS INDEX: 23.14 KG/M2 | TEMPERATURE: 97.1 F | OXYGEN SATURATION: 99 % | SYSTOLIC BLOOD PRESSURE: 125 MMHG | HEART RATE: 65 BPM | DIASTOLIC BLOOD PRESSURE: 71 MMHG

## 2021-08-12 DIAGNOSIS — K04.7 DENTAL ABSCESS: Primary | ICD-10-CM

## 2021-08-12 DIAGNOSIS — K08.89 PAIN, DENTAL: ICD-10-CM

## 2021-08-12 PROCEDURE — 99283 EMERGENCY DEPT VISIT LOW MDM: CPT

## 2021-08-12 PROCEDURE — 6370000000 HC RX 637 (ALT 250 FOR IP): Performed by: NURSE PRACTITIONER

## 2021-08-12 RX ORDER — IBUPROFEN 800 MG/1
800 TABLET ORAL ONCE
Status: COMPLETED | OUTPATIENT
Start: 2021-08-12 | End: 2021-08-12

## 2021-08-12 RX ORDER — PENICILLIN V POTASSIUM 500 MG/1
500 TABLET ORAL 4 TIMES DAILY
Qty: 28 TABLET | Refills: 0 | Status: SHIPPED | OUTPATIENT
Start: 2021-08-12 | End: 2021-08-12 | Stop reason: SDUPTHER

## 2021-08-12 RX ORDER — PENICILLIN V POTASSIUM 250 MG/1
500 TABLET ORAL ONCE
Status: COMPLETED | OUTPATIENT
Start: 2021-08-12 | End: 2021-08-12

## 2021-08-12 RX ORDER — TRAMADOL HYDROCHLORIDE 50 MG/1
50 TABLET ORAL EVERY 4 HOURS PRN
Qty: 12 TABLET | Refills: 0 | Status: SHIPPED | OUTPATIENT
Start: 2021-08-12 | End: 2021-08-15

## 2021-08-12 RX ORDER — IBUPROFEN 600 MG/1
600 TABLET ORAL EVERY 6 HOURS PRN
Qty: 28 TABLET | Refills: 0 | Status: SHIPPED | OUTPATIENT
Start: 2021-08-12 | End: 2021-08-19

## 2021-08-12 RX ORDER — PENICILLIN V POTASSIUM 500 MG/1
500 TABLET ORAL 4 TIMES DAILY
Qty: 28 TABLET | Refills: 0 | Status: SHIPPED | OUTPATIENT
Start: 2021-08-12 | End: 2021-08-19

## 2021-08-12 RX ORDER — TRAMADOL HYDROCHLORIDE 50 MG/1
50 TABLET ORAL EVERY 4 HOURS PRN
Qty: 12 TABLET | Refills: 0 | Status: SHIPPED | OUTPATIENT
Start: 2021-08-12 | End: 2021-08-12 | Stop reason: SDUPTHER

## 2021-08-12 RX ADMIN — IBUPROFEN 800 MG: 800 TABLET, FILM COATED ORAL at 10:22

## 2021-08-12 RX ADMIN — PENICILLIN V POTASSIUM 500 MG: 250 TABLET, FILM COATED ORAL at 10:22

## 2021-08-12 ASSESSMENT — ENCOUNTER SYMPTOMS
DIARRHEA: 0
ABDOMINAL DISTENTION: 0
SINUS PAIN: 0
CONSTIPATION: 0
SORE THROAT: 0
SINUS PRESSURE: 0
TROUBLE SWALLOWING: 0
CHEST TIGHTNESS: 0
NAUSEA: 0
ABDOMINAL PAIN: 0
RHINORRHEA: 0
COUGH: 0
WHEEZING: 0
BACK PAIN: 0
SHORTNESS OF BREATH: 0
COLOR CHANGE: 0
VOMITING: 0

## 2021-08-12 ASSESSMENT — PAIN DESCRIPTION - PAIN TYPE: TYPE: ACUTE PAIN

## 2021-08-12 ASSESSMENT — PAIN SCALES - GENERAL
PAINLEVEL_OUTOF10: 10
PAINLEVEL_OUTOF10: 10

## 2021-08-12 ASSESSMENT — PAIN DESCRIPTION - DESCRIPTORS: DESCRIPTORS: ACHING

## 2021-08-12 ASSESSMENT — PAIN DESCRIPTION - LOCATION: LOCATION: TEETH

## 2021-08-12 NOTE — ED PROVIDER NOTES
dysuria, flank pain, frequency and urgency. Musculoskeletal: Negative for arthralgias, back pain, myalgias, neck pain and neck stiffness. Skin: Negative for color change and rash. Neurological: Negative for dizziness, tremors, seizures, syncope, speech difficulty, weakness, light-headedness, numbness and headaches. Except as noted above the remainder of the review of systems was reviewed and negative. PAST MEDICAL HISTORY   History reviewed. No pertinent past medical history. History reviewed. No pertinent surgical history. Social History     Socioeconomic History    Marital status: Single     Spouse name: None    Number of children: None    Years of education: None    Highest education level: None   Occupational History    None   Tobacco Use    Smoking status: Current Every Day Smoker     Packs/day: 1.00     Years: 15.00     Pack years: 15.00     Types: Cigarettes    Smokeless tobacco: Never Used   Substance and Sexual Activity    Alcohol use: Never    Drug use: Yes     Types: Marijuana    Sexual activity: None   Other Topics Concern    None   Social History Narrative    None     Social Determinants of Health     Financial Resource Strain:     Difficulty of Paying Living Expenses:    Food Insecurity:     Worried About Running Out of Food in the Last Year:     Ran Out of Food in the Last Year:    Transportation Needs:     Lack of Transportation (Medical):      Lack of Transportation (Non-Medical):    Physical Activity:     Days of Exercise per Week:     Minutes of Exercise per Session:    Stress:     Feeling of Stress :    Social Connections:     Frequency of Communication with Friends and Family:     Frequency of Social Gatherings with Friends and Family:     Attends [employer] Services:     Active Member of Clubs or Organizations:     Attends Club or Organization Meetings:     Marital Status:    Intimate Partner Violence:     Fear of Current or Ex-Partner:     Emotionally Abused:     Physically Abused:     Sexually Abused:        SCREENINGS             PHYSICAL EXAM    (up to 7 for level 4, 8 or more for level 5)     ED Triage Vitals [08/12/21 0958]   BP Temp Temp Source Pulse Resp SpO2 Height Weight   125/71 97.1 °F (36.2 °C) Temporal 65 16 99 % 6' 4\" (1.93 m) 190 lb (86.2 kg)       Physical Exam  Constitutional:       General: He is not in acute distress. Appearance: Normal appearance. He is normal weight. He is not ill-appearing, toxic-appearing or diaphoretic. HENT:      Head: Normocephalic and atraumatic. Right Ear: Hearing and external ear normal.      Left Ear: Hearing and external ear normal.      Nose: Nose normal.      Mouth/Throat:      Lips: Pink. Dentition: Abnormal dentition. Dental tenderness, gingival swelling, dental caries and dental abscesses present. Pharynx: Oropharynx is clear. No posterior oropharyngeal erythema. Eyes:      General:         Right eye: No discharge. Left eye: No discharge. Conjunctiva/sclera: Conjunctivae normal.      Pupils: Pupils are equal, round, and reactive to light. Cardiovascular:      Rate and Rhythm: Normal rate and regular rhythm. Pulses: Normal pulses. Pulmonary:      Effort: Pulmonary effort is normal.      Breath sounds: Normal breath sounds. Abdominal:      General: There is no distension. Palpations: Abdomen is soft. Tenderness: There is no abdominal tenderness. Musculoskeletal:         General: No tenderness or signs of injury. Normal range of motion. Cervical back: Normal range of motion and neck supple. No rigidity or tenderness. Skin:     General: Skin is warm and dry. Capillary Refill: Capillary refill takes less than 2 seconds. Neurological:      General: No focal deficit present. Mental Status: He is alert and oriented to person, place, and time. Mental status is at baseline. Cranial Nerves: No cranial nerve deficit. Sensory: No sensory deficit. Motor: No weakness. Coordination: Coordination normal.         RESULTS     EKG: All EKG's are interpreted by the Emergency Department Physician who either signs or Co-signsthis chart in the absence of a cardiologist.        RADIOLOGY:   Johnella Belling such as CT, Ultrasound and MRI are read by the radiologist. Plain radiographic images are visualized and preliminarily interpreted by the emergency physician with the below findings:        Interpretation per the Radiologist below, if available at the time ofthis note:    No orders to display         ED BEDSIDE ULTRASOUND:   Performed by ED Physician - none    LABS:  Labs Reviewed - No data to display    All other labs were within normal range or not returned as of this dictation. EMERGENCY DEPARTMENT COURSE and DIFFERENTIAL DIAGNOSIS/MDM:   Vitals:    Vitals:    08/12/21 0958   BP: 125/71   Pulse: 65   Resp: 16   Temp: 97.1 °F (36.2 °C)   TempSrc: Temporal   SpO2: 99%   Weight: 190 lb (86.2 kg)   Height: 6' 4\" (1.93 m)            MDM patient presents afebrile nontoxic no acute distress hemodynamically stable. Complains of left-sided upper and lower jaw pain upon physical examination there is notable dental decay with a concern for the lower jaw developing abscess of the first bicuspid. Patient given first dose of penicillin and additional Motrin in the ER. Discharged home with penicillin Motrin and tramadol for more intense pains. Patient has taken tramadol in the past done well with this. Instructed to follow-up with dentist as soon as possible, the patient states that he is trying to establish a new dentist.  He was given packet of dental contact information for additional resources. Return to the ER if any onset of new concerns and worsening condition specifically signs of worsening infection or spreading to the face. Patient verbalized understanding will give instruction education.     CRITICAL CARE TIME CONSULTS:  None    PROCEDURES:  Unless otherwise noted below, none     Procedures    FINAL IMPRESSION      1. Dental abscess    2. Pain, dental          DISPOSITION/PLAN   DISPOSITION Decision To Discharge 08/12/2021 10:29:59 AM      PATIENT REFERRED TO:  Bobby Mcgovern PA-C  [address] [place] [address], [address]  [address] [facility]  [phone]    Call in 1 day        DISCHARGE MEDICATIONS:  New Prescriptions    IBUPROFEN (ADVIL;MOTRIN) 600 MG TABLET    Take 1 tablet by mouth every 6 hours as needed for Pain    PENICILLIN V POTASSIUM (VEETID) 500 MG TABLET    Take 1 tablet by mouth 4 times daily for 7 days    TRAMADOL (ULTRAM) 50 MG TABLET    Take 1 tablet by mouth every 4 hours as needed for Pain for up to 3 days. Intended supply: 3 days.  Take lowest dose possible to manage pain          (Please notethat portions of this note were completed with a voice recognition program.  Efforts were made to edit the dictations but occasionally words are mis-transcribed.)    TRAMAINE Arellano CNP (electronically signed)  Attending Emergency Physician         TRAMAINE Arellano CNP  08/12/21 8030

## 2025-01-30 ENCOUNTER — APPOINTMENT (OUTPATIENT)
Dept: CT IMAGING | Age: 55
End: 2025-01-30
Payer: MEDICAID

## 2025-01-30 ENCOUNTER — HOSPITAL ENCOUNTER (EMERGENCY)
Age: 55
Discharge: HOME | End: 2025-01-30
Payer: MEDICAID

## 2025-01-30 VITALS
HEART RATE: 60 BPM | RESPIRATION RATE: 18 BRPM | OXYGEN SATURATION: 100 % | HEIGHT: 77 IN | WEIGHT: 205 LBS | BODY MASS INDEX: 24.21 KG/M2 | TEMPERATURE: 98 F | DIASTOLIC BLOOD PRESSURE: 91 MMHG | SYSTOLIC BLOOD PRESSURE: 151 MMHG

## 2025-01-30 DIAGNOSIS — R41.89 BRAIN FOG: Primary | ICD-10-CM

## 2025-01-30 DIAGNOSIS — H53.149 PHOTOPHOBIA: ICD-10-CM

## 2025-01-30 LAB
ALBUMIN SERPL-MCNC: 4.4 G/DL (ref 3.5–4.6)
ALP SERPL-CCNC: 93 U/L (ref 35–104)
ALT SERPL-CCNC: 9 U/L (ref 0–41)
AMPHET UR QL SCN: ABNORMAL
ANION GAP SERPL CALCULATED.3IONS-SCNC: 8 MEQ/L (ref 9–15)
AST SERPL-CCNC: 20 U/L (ref 0–40)
BACTERIA URNS QL MICRO: NEGATIVE /HPF
BARBITURATES UR QL SCN: ABNORMAL
BASOPHILS # BLD: 0 K/UL (ref 0–0.2)
BASOPHILS NFR BLD: 0.7 %
BENZODIAZ UR QL SCN: ABNORMAL
BILIRUB SERPL-MCNC: 1 MG/DL (ref 0.2–0.7)
BILIRUB UR QL STRIP: NEGATIVE
BUN SERPL-MCNC: 12 MG/DL (ref 6–20)
CALCIUM SERPL-MCNC: 9 MG/DL (ref 8.5–9.9)
CANNABINOIDS UR QL SCN: POSITIVE
CHLORIDE SERPL-SCNC: 102 MEQ/L (ref 95–107)
CLARITY UR: CLEAR
CO2 SERPL-SCNC: 28 MEQ/L (ref 20–31)
COCAINE UR QL SCN: ABNORMAL
COLOR UR: YELLOW
CREAT SERPL-MCNC: 1.32 MG/DL (ref 0.7–1.2)
DRUG SCREEN COMMENT UR-IMP: ABNORMAL
EOSINOPHIL # BLD: 0.1 K/UL (ref 0–0.7)
EOSINOPHIL NFR BLD: 1.7 %
EPI CELLS #/AREA URNS AUTO: NORMAL /HPF (ref 0–5)
ERYTHROCYTE [DISTWIDTH] IN BLOOD BY AUTOMATED COUNT: 12.6 % (ref 11.5–14.5)
FENTANYL SCREEN, URINE: ABNORMAL
GLOBULIN SER CALC-MCNC: 2.6 G/DL (ref 2.3–3.5)
GLUCOSE SERPL-MCNC: 135 MG/DL (ref 70–99)
GLUCOSE UR STRIP-MCNC: NEGATIVE MG/DL
HCT VFR BLD AUTO: 42.2 % (ref 42–52)
HGB BLD-MCNC: 14.8 G/DL (ref 14–18)
HGB UR QL STRIP: NEGATIVE
HYALINE CASTS #/AREA URNS AUTO: NORMAL /HPF (ref 0–5)
KETONES UR STRIP-MCNC: NEGATIVE MG/DL
LEUKOCYTE ESTERASE UR QL STRIP: ABNORMAL
LYMPHOCYTES # BLD: 2.1 K/UL (ref 1–4.8)
LYMPHOCYTES NFR BLD: 39.5 %
MCH RBC QN AUTO: 33.6 PG (ref 27–31.3)
MCHC RBC AUTO-ENTMCNC: 35.1 % (ref 33–37)
MCV RBC AUTO: 95.9 FL (ref 79–92.2)
METHADONE UR QL SCN: ABNORMAL
MONOCYTES # BLD: 0.6 K/UL (ref 0.2–0.8)
MONOCYTES NFR BLD: 11.4 %
NEUTROPHILS # BLD: 2.5 K/UL (ref 1.4–6.5)
NEUTS SEG NFR BLD: 46.5 %
NITRITE UR QL STRIP: NEGATIVE
OPIATES UR QL SCN: ABNORMAL
OXYCODONE UR QL SCN: ABNORMAL
PCP UR QL SCN: ABNORMAL
PH UR STRIP: 6 [PH] (ref 5–9)
PLATELET # BLD AUTO: 322 K/UL (ref 130–400)
POTASSIUM SERPL-SCNC: 4.3 MEQ/L (ref 3.4–4.9)
PROPOXYPH UR QL SCN: ABNORMAL
PROT SERPL-MCNC: 7 G/DL (ref 6.3–8)
PROT UR STRIP-MCNC: NEGATIVE MG/DL
RBC # BLD AUTO: 4.4 M/UL (ref 4.7–6.1)
RBC #/AREA URNS AUTO: NORMAL /HPF (ref 0–5)
SODIUM SERPL-SCNC: 138 MEQ/L (ref 135–144)
SP GR UR STRIP: 1.02 (ref 1–1.03)
URINE REFLEX TO CULTURE: ABNORMAL
UROBILINOGEN UR STRIP-ACNC: 1 E.U./DL
WBC # BLD AUTO: 5.3 K/UL (ref 4.8–10.8)
WBC #/AREA URNS AUTO: NORMAL /HPF (ref 0–5)

## 2025-01-30 PROCEDURE — 80307 DRUG TEST PRSMV CHEM ANLYZR: CPT

## 2025-01-30 PROCEDURE — 85025 COMPLETE CBC W/AUTO DIFF WBC: CPT

## 2025-01-30 PROCEDURE — 70450 CT HEAD/BRAIN W/O DYE: CPT

## 2025-01-30 PROCEDURE — 2580000003 HC RX 258: Performed by: PHYSICIAN ASSISTANT

## 2025-01-30 PROCEDURE — 6370000000 HC RX 637 (ALT 250 FOR IP): Performed by: PHYSICIAN ASSISTANT

## 2025-01-30 PROCEDURE — 99284 EMERGENCY DEPT VISIT MOD MDM: CPT

## 2025-01-30 PROCEDURE — 80053 COMPREHEN METABOLIC PANEL: CPT

## 2025-01-30 PROCEDURE — 81001 URINALYSIS AUTO W/SCOPE: CPT

## 2025-01-30 RX ORDER — ACETAMINOPHEN 500 MG
1000 TABLET ORAL ONCE
Status: COMPLETED | OUTPATIENT
Start: 2025-01-30 | End: 2025-01-30

## 2025-01-30 RX ORDER — 0.9 % SODIUM CHLORIDE 0.9 %
500 INTRAVENOUS SOLUTION INTRAVENOUS ONCE
Status: COMPLETED | OUTPATIENT
Start: 2025-01-30 | End: 2025-01-30

## 2025-01-30 RX ADMIN — ACETAMINOPHEN 1000 MG: 500 TABLET ORAL at 09:01

## 2025-01-30 RX ADMIN — SODIUM CHLORIDE 500 ML: 9 INJECTION, SOLUTION INTRAVENOUS at 09:01

## 2025-01-30 ASSESSMENT — ENCOUNTER SYMPTOMS
ABDOMINAL PAIN: 0
EYE PAIN: 0
NAUSEA: 0
SORE THROAT: 0
VOMITING: 0
DIARRHEA: 0
PHOTOPHOBIA: 0
RHINORRHEA: 0
SHORTNESS OF BREATH: 0
COUGH: 0
BACK PAIN: 0

## 2025-01-30 ASSESSMENT — LIFESTYLE VARIABLES
HOW MANY STANDARD DRINKS CONTAINING ALCOHOL DO YOU HAVE ON A TYPICAL DAY: PATIENT DOES NOT DRINK
HOW OFTEN DO YOU HAVE A DRINK CONTAINING ALCOHOL: NEVER

## 2025-01-30 ASSESSMENT — PAIN - FUNCTIONAL ASSESSMENT
PAIN_FUNCTIONAL_ASSESSMENT: 0-10
PAIN_FUNCTIONAL_ASSESSMENT: 0-10

## 2025-01-30 ASSESSMENT — PAIN DESCRIPTION - DESCRIPTORS
DESCRIPTORS: ACHING
DESCRIPTORS: ACHING

## 2025-01-30 ASSESSMENT — PAIN SCALES - GENERAL
PAINLEVEL_OUTOF10: 5
PAINLEVEL_OUTOF10: 5
PAINLEVEL_OUTOF10: 0

## 2025-01-30 ASSESSMENT — PAIN DESCRIPTION - PAIN TYPE: TYPE: ACUTE PAIN

## 2025-01-30 ASSESSMENT — PAIN DESCRIPTION - LOCATION
LOCATION: HEAD
LOCATION: HEAD
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
## 2025-03-09 ENCOUNTER — HOSPITAL ENCOUNTER (EMERGENCY)
Age: 55
Discharge: HOME | End: 2025-03-09
Payer: MEDICAID

## 2025-03-09 VITALS
DIASTOLIC BLOOD PRESSURE: 91 MMHG | HEIGHT: 76 IN | RESPIRATION RATE: 20 BRPM | HEART RATE: 78 BPM | SYSTOLIC BLOOD PRESSURE: 158 MMHG | TEMPERATURE: 98.3 F | BODY MASS INDEX: 25.5 KG/M2 | WEIGHT: 209.4 LBS | OXYGEN SATURATION: 98 %

## 2025-03-09 DIAGNOSIS — R51.9 CHRONIC NONINTRACTABLE HEADACHE, UNSPECIFIED HEADACHE TYPE: Primary | ICD-10-CM

## 2025-03-09 DIAGNOSIS — G89.29 CHRONIC NONINTRACTABLE HEADACHE, UNSPECIFIED HEADACHE TYPE: Primary | ICD-10-CM

## 2025-03-09 PROCEDURE — 6370000000 HC RX 637 (ALT 250 FOR IP)

## 2025-03-09 PROCEDURE — 99283 EMERGENCY DEPT VISIT LOW MDM: CPT

## 2025-03-09 RX ORDER — IBUPROFEN 600 MG/1
600 TABLET, FILM COATED ORAL ONCE
Status: COMPLETED | OUTPATIENT
Start: 2025-03-09 | End: 2025-03-09

## 2025-03-09 RX ORDER — ACETAMINOPHEN 325 MG/1
650 TABLET ORAL ONCE
Status: COMPLETED | OUTPATIENT
Start: 2025-03-09 | End: 2025-03-09

## 2025-03-09 RX ORDER — ACETAMINOPHEN 500 MG
1000 TABLET ORAL 3 TIMES DAILY
Qty: 60 TABLET | Refills: 0 | Status: SHIPPED | OUTPATIENT
Start: 2025-03-09

## 2025-03-09 RX ORDER — IBUPROFEN 600 MG/1
600 TABLET, FILM COATED ORAL 2 TIMES DAILY PRN
Qty: 20 TABLET | Refills: 0 | Status: SHIPPED | OUTPATIENT
Start: 2025-03-09

## 2025-03-09 RX ADMIN — IBUPROFEN 600 MG: 600 TABLET, FILM COATED ORAL at 15:00

## 2025-03-09 RX ADMIN — ACETAMINOPHEN 650 MG: 325 TABLET ORAL at 15:00

## 2025-03-09 ASSESSMENT — PAIN SCALES - GENERAL
PAINLEVEL_OUTOF10: 7
PAINLEVEL_OUTOF10: 7

## 2025-03-09 ASSESSMENT — PAIN DESCRIPTION - DESCRIPTORS
DESCRIPTORS: THROBBING
DESCRIPTORS: THROBBING

## 2025-03-09 ASSESSMENT — PAIN DESCRIPTION - LOCATION
LOCATION: HEAD
LOCATION: HEAD

## 2025-03-09 ASSESSMENT — PAIN - FUNCTIONAL ASSESSMENT: PAIN_FUNCTIONAL_ASSESSMENT: 0-10

## 2025-03-09 ASSESSMENT — PAIN DESCRIPTION - PAIN TYPE: TYPE: ACUTE PAIN

## 2025-03-09 NOTE — ED NOTES
Care coordination at the bedside to assist patient with PCP and Specialist information and appointment
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
## 2025-03-09 NOTE — ED PROVIDER NOTES
[facility] EMERGENCY DEPARTMENT  EMERGENCY DEPARTMENT ENCOUNTER      Pt Name: Joel Avalos  MRN: [id number]  Birthdate 1970  Date of evaluation: 3/9/2025  Provider: TRAMAINE Villavicencio CNP  2:48 PM EDT    CHIEF COMPLAINT       Chief Complaint   Patient presents with    Headache     X2 days   Blurry vision          HISTORY OF PRESENT ILLNESS   (Location/Symptom, Timing/Onset, Context/Setting, Quality, Duration, Modifying Factors, Severity)  Note limiting factors.   Joel Avalos is a 54 y.o. male who presents to the emergency department for headache. He states that the headache has been ongoing intermittently for 2 years following a head trauma. He states his current headache has been present for 2 days with gradual on set. No new head injuries. No fevers. States pain is similar to previous headaches. No numbness or tingling. Rates pain 7/10. He is requesting ibuprofen and tylenol for the pain and states that usually relieves the pain. He would like referral to neurology and note for work.       HPI    Nursing Notes were reviewed.    REVIEW OF SYSTEMS    (2-9 systems for level 4, 10 or more for level 5)     Review of Systems   Constitutional:  Negative for activity change, appetite change, chills and fever.   HENT:  Negative for congestion, rhinorrhea and sore throat.    Eyes:  Positive for photophobia. Negative for pain, discharge and visual disturbance.   Respiratory:  Negative for cough and shortness of breath.    Cardiovascular:  Negative for chest pain.   Gastrointestinal:  Negative for abdominal pain, diarrhea, nausea and vomiting.   Skin:  Negative for color change.   Neurological:  Positive for headaches. Negative for dizziness and light-headedness.       Except as noted above the remainder of the review of systems was reviewed and negative.       PAST MEDICAL HISTORY     Past Medical History:   Diagnosis Date    Hypertension          SURGICAL HISTORY     History reviewed. No pertinent

## 2025-04-04 ENCOUNTER — TELEPHONE (OUTPATIENT)
Age: 55
End: 2025-04-04

## 2025-08-26 ENCOUNTER — OFFICE VISIT (OUTPATIENT)
Dept: PRIMARY CARE CLINIC | Age: 55
End: 2025-08-26

## 2025-08-26 VITALS
HEART RATE: 59 BPM | WEIGHT: 202.4 LBS | HEIGHT: 76 IN | BODY MASS INDEX: 24.65 KG/M2 | SYSTOLIC BLOOD PRESSURE: 136 MMHG | DIASTOLIC BLOOD PRESSURE: 80 MMHG | OXYGEN SATURATION: 100 %

## 2025-08-26 DIAGNOSIS — Z11.59 SCREENING FOR MEASLES: ICD-10-CM

## 2025-08-26 DIAGNOSIS — Z82.49 FAMILY HISTORY OF MI (MYOCARDIAL INFARCTION): ICD-10-CM

## 2025-08-26 DIAGNOSIS — R79.89 ELEVATED SERUM CREATININE: ICD-10-CM

## 2025-08-26 DIAGNOSIS — Z72.0 TOBACCO ABUSE: ICD-10-CM

## 2025-08-26 DIAGNOSIS — Z83.3 FAMILY HISTORY OF DIABETES MELLITUS: ICD-10-CM

## 2025-08-26 DIAGNOSIS — Z11.59 NEED FOR HEPATITIS C SCREENING TEST: ICD-10-CM

## 2025-08-26 DIAGNOSIS — Z11.4 SCREENING FOR HIV WITHOUT PRESENCE OF RISK FACTORS: ICD-10-CM

## 2025-08-26 DIAGNOSIS — I10 PRIMARY HYPERTENSION: ICD-10-CM

## 2025-08-26 DIAGNOSIS — Z00.01 ABNORMAL PHYSICAL EVALUATION: Primary | ICD-10-CM

## 2025-08-26 PROBLEM — F43.20 GRIEF REACTION: Status: RESOLVED | Noted: 2020-10-23 | Resolved: 2025-08-26

## 2025-08-26 LAB
ANION GAP SERPL CALCULATED.3IONS-SCNC: 10 MEQ/L (ref 9–15)
BUN SERPL-MCNC: 10 MG/DL (ref 6–20)
CALCIUM SERPL-MCNC: 9.9 MG/DL (ref 8.5–9.9)
CHLORIDE SERPL-SCNC: 101 MEQ/L (ref 95–107)
CHOLEST SERPL-MCNC: 178 MG/DL (ref 0–199)
CO2 SERPL-SCNC: 29 MEQ/L (ref 20–31)
CREAT SERPL-MCNC: 1.17 MG/DL (ref 0.7–1.2)
GLUCOSE SERPL-MCNC: 94 MG/DL (ref 70–99)
HDLC SERPL-MCNC: 49 MG/DL (ref 40–59)
LDL CHOLESTEROL: 116 MG/DL (ref 0–129)
POTASSIUM SERPL-SCNC: 4.7 MEQ/L (ref 3.4–4.9)
RUBELLA ANTIBODY IGG: 211.6 IU/ML
SODIUM SERPL-SCNC: 140 MEQ/L (ref 135–144)
TRIGLYCERIDE, FASTING: 63 MG/DL (ref 0–150)

## 2025-08-26 SDOH — ECONOMIC STABILITY: FOOD INSECURITY: WITHIN THE PAST 12 MONTHS, YOU WORRIED THAT YOUR FOOD WOULD RUN OUT BEFORE YOU GOT MONEY TO BUY MORE.: NEVER TRUE

## 2025-08-26 SDOH — ECONOMIC STABILITY: FOOD INSECURITY: WITHIN THE PAST 12 MONTHS, THE FOOD YOU BOUGHT JUST DIDN'T LAST AND YOU DIDN'T HAVE MONEY TO GET MORE.: NEVER TRUE

## 2025-08-26 ASSESSMENT — PATIENT HEALTH QUESTIONNAIRE - PHQ9
SUM OF ALL RESPONSES TO PHQ QUESTIONS 1-9: 0
1. LITTLE INTEREST OR PLEASURE IN DOING THINGS: NOT AT ALL
SUM OF ALL RESPONSES TO PHQ QUESTIONS 1-9: 0
SUM OF ALL RESPONSES TO PHQ QUESTIONS 1-9: 0
2. FEELING DOWN, DEPRESSED OR HOPELESS: NOT AT ALL
SUM OF ALL RESPONSES TO PHQ QUESTIONS 1-9: 0

## 2025-08-27 LAB
ESTIMATED AVERAGE GLUCOSE: 105 MG/DL
HBA1C MFR BLD: 5.3 % (ref 4–6)
HEPATITIS C ANTIBODY: NONREACTIVE
HIV AG/AB: NONREACTIVE
MEASLES IMMUNE (IGG): 3.73

## 2025-08-28 ENCOUNTER — RESULTS FOLLOW-UP (OUTPATIENT)
Dept: PRIMARY CARE CLINIC | Age: 55
End: 2025-08-28

## 2025-08-29 LAB — MUV IGG SER IA-ACNC: 13.2 AU/ML
